# Patient Record
Sex: FEMALE | Race: WHITE | NOT HISPANIC OR LATINO | URBAN - METROPOLITAN AREA
[De-identification: names, ages, dates, MRNs, and addresses within clinical notes are randomized per-mention and may not be internally consistent; named-entity substitution may affect disease eponyms.]

---

## 2024-10-24 ENCOUNTER — OFFICE VISIT (OUTPATIENT)
Dept: URGENT CARE | Age: 54
End: 2024-10-24
Payer: COMMERCIAL

## 2024-10-24 ENCOUNTER — ANCILLARY PROCEDURE (OUTPATIENT)
Dept: URGENT CARE | Age: 54
End: 2024-10-24
Payer: COMMERCIAL

## 2024-10-24 VITALS
SYSTOLIC BLOOD PRESSURE: 114 MMHG | OXYGEN SATURATION: 97 % | TEMPERATURE: 97.5 F | DIASTOLIC BLOOD PRESSURE: 83 MMHG | HEART RATE: 68 BPM | WEIGHT: 165 LBS | RESPIRATION RATE: 17 BRPM

## 2024-10-24 DIAGNOSIS — M79.672 LEFT FOOT PAIN: Primary | ICD-10-CM

## 2024-10-24 DIAGNOSIS — M79.672 LEFT FOOT PAIN: ICD-10-CM

## 2024-10-24 RX ORDER — COLCHICINE 0.6 MG/1
CAPSULE ORAL
Qty: 10 CAPSULE | Refills: 0 | Status: SHIPPED | OUTPATIENT
Start: 2024-10-24

## 2024-10-24 RX ORDER — NAPROXEN 500 MG/1
500 TABLET ORAL
Qty: 14 TABLET | Refills: 0 | Status: SHIPPED | OUTPATIENT
Start: 2024-10-24 | End: 2024-10-24

## 2024-10-24 RX ORDER — ATORVASTATIN CALCIUM 20 MG/1
1 TABLET, FILM COATED ORAL
COMMUNITY
Start: 2023-11-17

## 2024-10-24 ASSESSMENT — ENCOUNTER SYMPTOMS: ARTHRALGIAS: 1

## 2024-10-24 ASSESSMENT — PAIN SCALES - GENERAL: PAINLEVEL_OUTOF10: 8

## 2024-10-24 NOTE — PATIENT INSTRUCTIONS
Call [phone] to schedule an appointment with podiatry or visit [email or web address]

## 2024-10-24 NOTE — PROGRESS NOTES
Subjective   Patient ID: Shea Pa is a 54 y.o. female. They present today with a chief complaint of Other (Left foot pain).    History of Present Illness  Pt presents with left foot pain, Left great toe. A little over a week ago, she had similar pain that lasted for about 2-3 days then resolved. She states the day before this pain occurred, she was cleaning her floors on her knees and her toes were curled under for a long period of time while cleaning and she was not sure if this caused the pain. She then developed the pain again 2-3 days ago and states pain is more severe. Pain is worse with touch and walking. There is some swelling over the mtp joint and redness. No hx of similar. No trauma noted. She tried advil and icy hot with some relief. She is otherwise healthy. No fever chills wounds numbness tingling or weakness.       History provided by:  Patient      Past Medical History  Allergies as of 10/24/2024    (No Known Allergies)       (Not in a hospital admission)       No past medical history on file.    No past surgical history on file.     reports that she has never smoked. She has never used smokeless tobacco.    Review of Systems  Review of Systems   Musculoskeletal:  Positive for arthralgias.   All other systems reviewed and are negative.                                 Objective    Vitals:    10/24/24 1102   BP: 114/83   BP Location: Left arm   Patient Position: Sitting   BP Cuff Size: Adult   Pulse: 68   Resp: 17   Temp: 36.4 °C (97.5 °F)   TempSrc: Oral   SpO2: 97%   Weight: 74.8 kg (165 lb)     No LMP recorded.    Physical Exam  Vitals and nursing note reviewed.   Constitutional:       General: She is not in acute distress.     Appearance: Normal appearance. She is not ill-appearing, toxic-appearing or diaphoretic.   Musculoskeletal:      Right lower leg: Normal.      Left lower leg: Normal.      Right ankle: Normal.      Left ankle: Normal.      Right foot: Normal.      Left foot: Normal range of  motion and normal capillary refill. Swelling (mild, great toe mtp with mild erythema) and tenderness (great toe mtp) present. No deformity, bunion, foot drop, laceration or crepitus. Normal pulse.      Comments: No wounds or lesions. The lle is nvi.    Neurological:      Mental Status: She is alert.         Procedures    Point of Care Test & Imaging Results from this visit  No results found for this visit on 10/24/24.   XR foot left 3+ views    Result Date: 10/24/2024  Interpreted By:  Mando Castro, STUDY: XR FOOT LEFT 3+ VIEWS; 10/24/2024 11:22 am   INDICATION: Signs/Symptoms:pain in left foot, great toe mtp.   COMPARISON: None.   ACCESSION NUMBER(S): [id number]   ORDERING CLINICIAN: ANA MARIA TARIQ   TECHNIQUE: Left foot three views   FINDINGS: No fractures or destructive lesions are identified. There is a 7 mm plantar calcaneal spur. No erosions are identified. No dystrophic calcifications are identified.       No acute pathologic findings are identified.   MACRO: none   Signed by: Mando Castro 10/24/2024 12:01 PM Dictation workstation:   [id number]     Diagnostic study results (if any) were reviewed by Ana Maria Tariq PA-C.    Assessment/Plan   Allergies, medications, history, and pertinent labs/EKGs/Imaging reviewed by Ana Maria Tariq PA-C.     Medical Decision Making  Suspect gout, rx colchicine. Pt denies hx of CKD. Hx of gastric bypass in 2016 therefore did not rx nsaid. Low concern for septic joint. Advised low purine diet. Follow up with podiatry and or primary care in 2-3 weeks. Return if needed     Orders and Diagnoses  Diagnoses and all orders for this visit:  Left foot pain  -     XR foot left 3+ views; Future  -     colchicine (Mitigare) 0.6 mg capsule capsule; Day 1: Take 1.2 mg (2 tablets) by mouth, followed by 0.6 mg (1 tablet) by mouth 1 hour from first dose. Day 2: You may take 0.6 mg (1 tablet) by mouth 1-2 times daily until the flare resolves.      Medical Admin Record      Patient  disposition: Home    Electronically signed by Ana Maria Tariq PA-C  12:06 PM